# Patient Record
Sex: FEMALE | Race: BLACK OR AFRICAN AMERICAN | NOT HISPANIC OR LATINO | Employment: STUDENT | URBAN - METROPOLITAN AREA
[De-identification: names, ages, dates, MRNs, and addresses within clinical notes are randomized per-mention and may not be internally consistent; named-entity substitution may affect disease eponyms.]

---
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
## 2024-01-26 ENCOUNTER — ROUTINE PRENATAL (OUTPATIENT)
Dept: OBSTETRICS AND GYNECOLOGY | Facility: CLINIC | Age: 19
End: 2024-01-26
Payer: COMMERCIAL

## 2024-01-26 VITALS — DIASTOLIC BLOOD PRESSURE: 60 MMHG | BODY MASS INDEX: 44.97 KG/M2 | SYSTOLIC BLOOD PRESSURE: 110 MMHG | WEIGHT: 285 LBS

## 2024-01-26 DIAGNOSIS — Z34.03 PRIMIGRAVIDA IN THIRD TRIMESTER (HHS-HCC): Primary | ICD-10-CM

## 2024-01-26 DIAGNOSIS — O09.30 LATE PRENATAL CARE (HHS-HCC): ICD-10-CM

## 2024-01-26 LAB
AMPHETAMINES UR QL SCN: NORMAL
BARBITURATES UR QL SCN: NORMAL
BENZODIAZ UR QL SCN: NORMAL
BZE UR QL SCN: NORMAL
CANNABINOIDS UR QL SCN: NORMAL
FENTANYL+NORFENTANYL UR QL SCN: NORMAL
OPIATES UR QL SCN: NORMAL
OXYCODONE+OXYMORPHONE UR QL SCN: NORMAL
PCP UR QL SCN: NORMAL

## 2024-01-26 PROCEDURE — 90715 TDAP VACCINE 7 YRS/> IM: CPT | Performed by: STUDENT IN AN ORGANIZED HEALTH CARE EDUCATION/TRAINING PROGRAM

## 2024-01-26 PROCEDURE — 87081 CULTURE SCREEN ONLY: CPT

## 2024-01-26 PROCEDURE — 90471 IMMUNIZATION ADMIN: CPT | Performed by: STUDENT IN AN ORGANIZED HEALTH CARE EDUCATION/TRAINING PROGRAM

## 2024-01-26 PROCEDURE — 80307 DRUG TEST PRSMV CHEM ANLYZR: CPT

## 2024-01-26 PROCEDURE — 0501F PRENATAL FLOW SHEET: CPT | Performed by: STUDENT IN AN ORGANIZED HEALTH CARE EDUCATION/TRAINING PROGRAM

## 2024-01-26 NOTE — PROGRESS NOTES
Subjective   Patient ID [id number]   Veronique Mariscal is a 18 y.o.  at Unknown with a working estimated date of delivery of Not found. who presents for a routine prenatal visit. She denies vaginal bleeding, leakage of fluid, decreased fetal movements, or contractions.    Her pregnancy is complicated by: Scant prenatal care, Obesity, teen pregnancy.      Objective   Physical Exam:   Weight: 129 kg (285 lb)  Expected Total Weight Gain: 5 kg (11 lb)-9 kg (19 lb)   Pregravid BMI: 38.52  BP: 110/60  Fetal Heart Rate: 150 Fundal Height (cm): 35 cm Presentation: Vertex             Lab Results   Component Value Date    HGB 10.5 (L) 10/23/2023    HCT 34.1 (L) 10/23/2023    ABO O 10/23/2023    ABO O 10/23/2023    HEPBSAG Nonreactive 10/23/2023       Assessment/Plan   Problem List Items Addressed This Visit       Obesity complicating childbirth    Overview     162 mg ASA qDay.         Primigravida - Primary    Overview     Patient needs urgent evaluation for late prenatal care.   She needs a glucose tolerance test, anatomy US, and routine labs.  Patient plans to go to [facility] for care, I called their L&D, she is going there to triage for urgent evaluation. She plans to follow up and establish care with [facility].         Relevant Orders    Group B Streptococcus (GBS) Prenatal Screen, Culture (Completed)    Tdap vaccine, age 7 years and older  (BOOSTRIX) (Completed)    Late prenatal care    Overview     Unsure LMP.   For growths following anatomy/dating US.         Relevant Orders    Drug Screen, Urine With Reflex to Confirmation (Completed)    Tdap vaccine, age 7 years and older  (BOOSTRIX) (Completed)         Follow up in 1 week for a routine prenatal visit.

## 2024-01-29 LAB — GP B STREP GENITAL QL CULT: NORMAL
